# Patient Record
Sex: MALE | Race: AMERICAN INDIAN OR ALASKA NATIVE | NOT HISPANIC OR LATINO | Employment: OTHER | URBAN - METROPOLITAN AREA
[De-identification: names, ages, dates, MRNs, and addresses within clinical notes are randomized per-mention and may not be internally consistent; named-entity substitution may affect disease eponyms.]

---

## 2024-05-06 ENCOUNTER — HOSPITAL ENCOUNTER (OUTPATIENT)
Dept: RADIOLOGY | Facility: CLINIC | Age: 64
Discharge: HOME | End: 2024-05-06
Payer: MEDICARE

## 2024-05-06 ENCOUNTER — LAB (OUTPATIENT)
Dept: LAB | Facility: LAB | Age: 64
End: 2024-05-06
Payer: MEDICARE

## 2024-05-06 DIAGNOSIS — E11.9 TYPE 2 DIABETES MELLITUS WITHOUT COMPLICATIONS (MULTI): Primary | ICD-10-CM

## 2024-05-06 DIAGNOSIS — R05.1 ACUTE COUGH: ICD-10-CM

## 2024-05-06 LAB
ALBUMIN SERPL-MCNC: 4.4 G/DL (ref 3.5–5)
ALP BLD-CCNC: 85 U/L (ref 35–125)
ALT SERPL-CCNC: 23 U/L (ref 5–40)
ANION GAP SERPL CALC-SCNC: >19 MMOL/L
AST SERPL-CCNC: 18 U/L (ref 5–40)
BILIRUB SERPL-MCNC: 0.5 MG/DL (ref 0.1–1.2)
BUN SERPL-MCNC: 18 MG/DL (ref 8–25)
CALCIUM SERPL-MCNC: 9.8 MG/DL (ref 8.5–10.4)
CHLORIDE SERPL-SCNC: 95 MMOL/L (ref 97–107)
CHOLEST SERPL-MCNC: 168 MG/DL (ref 133–200)
CHOLEST/HDLC SERPL: 2.2 {RATIO}
CO2 SERPL-SCNC: 23 MMOL/L (ref 24–31)
CREAT SERPL-MCNC: 0.8 MG/DL (ref 0.4–1.6)
CREAT UR-MCNC: 48 MG/DL
EGFRCR SERPLBLD CKD-EPI 2021: >90 ML/MIN/1.73M*2
ERYTHROCYTE [DISTWIDTH] IN BLOOD BY AUTOMATED COUNT: 12.9 % (ref 11.5–14.5)
EST. AVERAGE GLUCOSE BLD GHB EST-MCNC: 189 MG/DL
GLUCOSE SERPL-MCNC: 167 MG/DL (ref 65–99)
HBA1C MFR BLD: 8.2 %
HCT VFR BLD AUTO: 47.9 % (ref 41–52)
HDLC SERPL-MCNC: 75 MG/DL
HGB BLD-MCNC: 15.5 G/DL (ref 13.5–17.5)
LDLC SERPL CALC-MCNC: 79 MG/DL (ref 65–130)
MCH RBC QN AUTO: 30.2 PG (ref 26–34)
MCHC RBC AUTO-ENTMCNC: 32.4 G/DL (ref 32–36)
MCV RBC AUTO: 93 FL (ref 80–100)
MICROALBUMIN UR-MCNC: <12 MG/L (ref 0–23)
MICROALBUMIN/CREAT UR: NORMAL MG/G{CREAT}
NRBC BLD-RTO: 0 /100 WBCS (ref 0–0)
PLATELET # BLD AUTO: 187 X10*3/UL (ref 150–450)
POTASSIUM SERPL-SCNC: 4.6 MMOL/L (ref 3.4–5.1)
PROT SERPL-MCNC: 7.6 G/DL (ref 5.9–7.9)
RBC # BLD AUTO: 5.13 X10*6/UL (ref 4.5–5.9)
SODIUM SERPL-SCNC: 138 MMOL/L (ref 133–145)
TRIGL SERPL-MCNC: 71 MG/DL (ref 40–150)
WBC # BLD AUTO: 7.4 X10*3/UL (ref 4.4–11.3)

## 2024-05-06 PROCEDURE — 80061 LIPID PANEL: CPT

## 2024-05-06 PROCEDURE — 71046 X-RAY EXAM CHEST 2 VIEWS: CPT | Performed by: RADIOLOGY

## 2024-05-06 PROCEDURE — 80053 COMPREHEN METABOLIC PANEL: CPT

## 2024-05-06 PROCEDURE — 71046 X-RAY EXAM CHEST 2 VIEWS: CPT

## 2024-05-06 PROCEDURE — 85027 COMPLETE CBC AUTOMATED: CPT

## 2024-05-06 PROCEDURE — 83036 HEMOGLOBIN GLYCOSYLATED A1C: CPT

## 2024-05-06 PROCEDURE — 36415 COLL VENOUS BLD VENIPUNCTURE: CPT

## 2024-05-06 PROCEDURE — 82043 UR ALBUMIN QUANTITATIVE: CPT

## 2024-05-06 PROCEDURE — 82570 ASSAY OF URINE CREATININE: CPT

## 2024-10-18 ENCOUNTER — APPOINTMENT (OUTPATIENT)
Dept: OTOLARYNGOLOGY | Facility: CLINIC | Age: 64
End: 2024-10-18
Payer: MEDICARE

## 2024-10-18 ENCOUNTER — CLINICAL SUPPORT (OUTPATIENT)
Dept: AUDIOLOGY | Facility: CLINIC | Age: 64
End: 2024-10-18
Payer: MEDICARE

## 2024-10-18 VITALS
WEIGHT: 255 LBS | BODY MASS INDEX: 34.54 KG/M2 | SYSTOLIC BLOOD PRESSURE: 138 MMHG | DIASTOLIC BLOOD PRESSURE: 89 MMHG | HEIGHT: 72 IN

## 2024-10-18 DIAGNOSIS — H69.91 DYSFUNCTION OF RIGHT EUSTACHIAN TUBE: Primary | ICD-10-CM

## 2024-10-18 DIAGNOSIS — J30.9 CHRONIC ALLERGIC RHINITIS: ICD-10-CM

## 2024-10-18 DIAGNOSIS — H90.11 CONDUCTIVE HEARING LOSS OF RIGHT EAR WITH UNRESTRICTED HEARING OF LEFT EAR: ICD-10-CM

## 2024-10-18 PROCEDURE — 92550 TYMPANOMETRY & REFLEX THRESH: CPT | Performed by: AUDIOLOGIST

## 2024-10-18 PROCEDURE — 1036F TOBACCO NON-USER: CPT | Performed by: OTOLARYNGOLOGY

## 2024-10-18 PROCEDURE — 3008F BODY MASS INDEX DOCD: CPT | Performed by: OTOLARYNGOLOGY

## 2024-10-18 PROCEDURE — 92557 COMPREHENSIVE HEARING TEST: CPT | Performed by: AUDIOLOGIST

## 2024-10-18 PROCEDURE — 99204 OFFICE O/P NEW MOD 45 MIN: CPT | Performed by: OTOLARYNGOLOGY

## 2024-10-18 RX ORDER — LOSARTAN POTASSIUM AND HYDROCHLOROTHIAZIDE 25; 100 MG/1; MG/1
1 TABLET ORAL DAILY
COMMUNITY

## 2024-10-18 RX ORDER — AMLODIPINE BESYLATE 5 MG/1
5 TABLET ORAL DAILY
COMMUNITY

## 2024-10-18 RX ORDER — FLUTICASONE PROPIONATE 50 MCG
2 SPRAY, SUSPENSION (ML) NASAL DAILY
Qty: 16 G | Refills: 11 | Status: SHIPPED | OUTPATIENT
Start: 2024-10-18 | End: 2025-10-18

## 2024-10-18 RX ORDER — CHOLECALCIFEROL (VITAMIN D3) 50 MCG
8000 TABLET ORAL
COMMUNITY

## 2024-10-18 RX ORDER — ROSUVASTATIN CALCIUM 10 MG/1
10 TABLET, COATED ORAL DAILY
COMMUNITY

## 2024-10-18 ASSESSMENT — PAIN SCALES - GENERAL: PAINLEVEL_OUTOF10: 0 - NO PAIN

## 2024-10-18 ASSESSMENT — ENCOUNTER SYMPTOMS: OCCASIONAL FEELINGS OF UNSTEADINESS: 0

## 2024-10-18 ASSESSMENT — PAIN - FUNCTIONAL ASSESSMENT: PAIN_FUNCTIONAL_ASSESSMENT: 0-10

## 2024-10-18 NOTE — PROGRESS NOTES
Impression:  1. Dysfunction of right eustachian tube        2. Conductive hearing loss of right ear with unrestricted hearing of left ear        3. Chronic allergic rhinitis             RECOMMENDATIONS/PLAN :  I reassured the patient there is no evidence of any infection in that right ear however his tympanic membrane is severely retracted with decreased mobility.  We will place him on Flonase nasal spray-2 puffs each nostril daily for the next 6 weeks.  I will see him back in the office and we will leave enough time to possibly place a tympanostomy incision if needed      **This electronic medical record note was created with the use of voice recognition software.  Despite proofreading, typographical or grammatical errors may be present that could affect meaning of content **    Subjective   Patient ID:     Darrell Velazquez is a 64 y.o. male who presents to the office today complaining of decreased hearing with fullness and pressure in the right ear.  The patient did have a history of ear infections when he was a child.  No recent fever chills or pus draining from his nose or sinuses.  No drainage from his ears.  No imbalance or vertigo.  Hearing is slightly down on that right side.    ROS:  A detailed 12 system review of systems is noted on the intake form has been reviewed with the patient with details noted in the HPI and scanned into the patient's medical record.    Objective     History reviewed. No pertinent past medical history.     History reviewed. No pertinent surgical history.     No Known Allergies       Current Outpatient Medications:     amLODIPine (Norvasc) 5 mg tablet, Take 1 tablet (5 mg) by mouth once daily., Disp: , Rfl:     cholecalciferol (Vitamin D-3) 50 MCG (2000 UT) tablet, Take 4 tablets (8,000 Units) by mouth., Disp: , Rfl:     losartan-hydrochlorothiazide (Hyzaar) 100-25 mg tablet, Take 1 tablet by mouth once daily., Disp: , Rfl:     rosuvastatin (Crestor) 10 mg tablet, Take 1 tablet (10 mg)  by mouth once daily., Disp: , Rfl:      Tobacco Use: Low Risk  (10/18/2024)    Patient History     Smoking Tobacco Use: Never     Smokeless Tobacco Use: Never     Passive Exposure: Not on file        Alcohol Use: Alcohol Misuse (9/17/2020)    [facility], [facility]    AUDIT-C     Frequency of Alcohol Consumption: 2-3 times a week     Average Number of Drinks: 3 or 4     Frequency of Binge Drinking: Never        Social History     Substance and Sexual Activity   Drug Use Not on file        Physical Exam:  Visit Vitals  /89 (BP Location: Right arm, Patient Position: Sitting, BP Cuff Size: Large adult)   Ht 1.829 m (6')   Wt 116 kg (255 lb)   BMI 34.58 kg/m²   Smoking Status Never   BSA 2.43 m²      General: Patient is alert, oriented, cooperative in no apparent distress.  Head: Normocephalic, atraumatic.  Eyes: PERRL, EOMI, Conjunctiva is clear. No nystagmus.  Ears: Right Ear-- Pinna is normal.  External auditory canal is patent. Tympanic membrane is retracted with decreased mobility.  He may have some noninfected fluid in that right middle ear space..  Mastoid is nontender.  Left ear-- Pinna is normal.  External auditory canal is patent. Tympanic membrane is [intact, translucent and has good mobility with my pneumatic otoscope.  No effusion].  Mastoid is nontender.  Nose: Septum is straight.  No septal perforation or lesions. No septal hematoma/ seroma.  No signs of bleeding.  Inferior turbinates are mildly swollen.   No evidence of intranasal polyps.  No infectious drainage.  Throat:  Floor of mouth is clear, no masses.  Tongue appears normal, no lesions or masses. Gums, gingiva, buccal mucosa appear pink and moist, no lesions. Teeth are in good repair.  No obvious dental infections.  Peritonsillar regions appear symmetric without swelling.  Hard and soft palate appear normal, no obvious cleft. Uvula is midline.  Oropharynx: No lesions. Retropharyngeal wall is flat.  No active  postnasal drip.  Neck: Supple,  no lymphadenopathy.  No masses.  Salivary Glands: Symmetric bilaterally.  No palpable masses.  No evidence of acute infection or salivary stones  Neurologic: Cranial Nerves 2-12 are grossly intact without focal deficits. Cerebellar function testing is normal.     Results:   I reviewed his recent audiogram and his hearing is normal in the left ear and he has a slight conductive loss in the right ear.  Right tympanic membrane is retracted.  Left 1 has good movement on tympanometry.  Word recognition scores are 100% bilaterally.  Speech reception threshold is 35 dB in the right ear and 20 dB in the left ear.    Procedure:   []    Dread Sin, DO

## 2024-12-06 ENCOUNTER — APPOINTMENT (OUTPATIENT)
Dept: OTOLARYNGOLOGY | Facility: CLINIC | Age: 64
End: 2024-12-06
Payer: MEDICARE

## 2024-12-06 DIAGNOSIS — H61.21 IMPACTED CERUMEN OF RIGHT EAR: ICD-10-CM

## 2024-12-06 DIAGNOSIS — H69.91 DYSFUNCTION OF RIGHT EUSTACHIAN TUBE: ICD-10-CM

## 2024-12-06 DIAGNOSIS — H92.01 RIGHT EAR PAIN: Primary | ICD-10-CM

## 2024-12-06 PROCEDURE — 99213 OFFICE O/P EST LOW 20 MIN: CPT | Performed by: OTOLARYNGOLOGY

## 2024-12-06 PROCEDURE — 69210 REMOVE IMPACTED EAR WAX UNI: CPT | Performed by: OTOLARYNGOLOGY

## 2024-12-06 NOTE — PROGRESS NOTES
Impression:              1. Right ear pain        2. Impacted cerumen of right ear        3. Dysfunction of right eustachian tube             Recommendations/Plan:  Using the operative microscope with alligator forcep, I was able to remove the firm piece of cerumen that was stuck against his tympanic membrane and causing pressure.  Afterwards he was doing much better and his tympanic membrane was moving fairly well today.  He will continue Flonase nasal spray for the next 2 weeks and then stop.  At this point I reassured the patient there is no evidence of any residual middle ear fluid today.  He can otherwise follow-up as needed.    **This electronic medical record note was created with the use of voice recognition software.  Despite proofreading, typographical or grammatical errors may be present that could affect meaning of content **    Subjective:  Darrell returns to the office today as a checkup on that right ear.  Overall he feels better.  He does not feel like there is fluid sloshing around in his right middle ear any longer.  No problems in the left ear.  Hearing has improved on that right side.  He still has some pressure on that right ear.    Objective:    There were no vitals taken for this visit.     Current Outpatient Medications   Medication Instructions    amLODIPine (NORVASC) 5 mg, Daily    cholecalciferol (VITAMIN D-3) 8,000 Units    fluticasone (Flonase) 50 mcg/actuation nasal spray 2 sprays, Each Nostril, Daily    losartan-hydrochlorothiazide (Hyzaar) 100-25 mg tablet 1 tablet, Daily    rosuvastatin (CRESTOR) 10 mg, Daily        No Known Allergies     Physical Exam:  Right ear-external canal reveals cerumen that is stuck against his tympanic membrane.  Using a straight pick with alligator forcep I was able to remove this false tympanic membrane.  His TM is still slightly retracted but there is no evidence of any middle ear effusion.  TM has fair mobility.  Middle ear space is clear.  Mastoid  nontender.  Left ear-external canal is patent.  TM intact.  No effusion.  Mastoid nontender.    Results:  []    Procedure:  After informed consent was obtained with the risks, benefits, complications, and alternatives explained to the patient / guardian, the patient was laid back in the ENT chair and the operative microscope was brought to the right ear.  A speculum was placed and using the operative microscope and/or curette/ suction, I was able to carefully remove all of the impacted cerumen that was  sitting against his right tympanic membrane.  After doing so, the patient was feeling much better and had much less pain.  The TM was [intact and slightly retracted however it is starting to move much better now.  No middle ear effusion.  Mastoid nontender.  The patient tolerated the procedure well and there were no complications.      Dread Sin, DO    [id number]

## 2025-02-17 ENCOUNTER — APPOINTMENT (OUTPATIENT)
Dept: OPHTHALMOLOGY | Facility: CLINIC | Age: 65
End: 2025-02-17
Payer: MEDICARE

## 2025-02-17 DIAGNOSIS — H25.813 COMBINED FORMS OF AGE-RELATED CATARACT OF BOTH EYES: ICD-10-CM

## 2025-02-17 DIAGNOSIS — E11.9 DIABETES MELLITUS TYPE 2 WITHOUT RETINOPATHY (MULTI): Primary | ICD-10-CM

## 2025-02-17 DIAGNOSIS — H52.03 HYPEROPIA OF BOTH EYES: ICD-10-CM

## 2025-02-17 PROCEDURE — 99204 OFFICE O/P NEW MOD 45 MIN: CPT | Performed by: OPHTHALMOLOGY

## 2025-02-17 ASSESSMENT — VISUAL ACUITY
OS_PH_SC: 20/30
OD_PH_SC: 20/25
OS_SC: 20/40
OS_SC+: -1
OD_PH_SC+: -2
OD_SC: 20/60
METHOD: SNELLEN - LINEAR
OS_BAT_MED: 20/20
OD_BAT_MED: 20/20
OD_SC+: -2

## 2025-02-17 ASSESSMENT — REFRACTION_MANIFEST
OS_AXIS: 115
OS_CYLINDER: -0.75
OD_AXIS: 075
OD_CYLINDER: -0.25
OD_SPHERE: +2.00
OS_SPHERE: +2.50
OD_AXIS: 075
METHOD_AUTOREFRACTION: 1
OS_SPHERE: +2.25
OS_AXIS: 115
OS_CYLINDER: -1.00
OD_CYLINDER: -0.25
OD_SPHERE: +2.00

## 2025-02-17 ASSESSMENT — CONF VISUAL FIELD
OD_INFERIOR_TEMPORAL_RESTRICTION: 0
OS_INFERIOR_TEMPORAL_RESTRICTION: 0
OS_NORMAL: 1
OD_SUPERIOR_TEMPORAL_RESTRICTION: 0
OD_SUPERIOR_NASAL_RESTRICTION: 0
OS_INFERIOR_NASAL_RESTRICTION: 0
OS_SUPERIOR_TEMPORAL_RESTRICTION: 0
OD_INFERIOR_NASAL_RESTRICTION: 0
OS_SUPERIOR_NASAL_RESTRICTION: 0
OD_NORMAL: 1

## 2025-02-17 ASSESSMENT — TONOMETRY
IOP_METHOD: GOLDMANN APPLANATION
OS_IOP_MMHG: 16
OD_IOP_MMHG: 16

## 2025-02-17 ASSESSMENT — EXTERNAL EXAM - RIGHT EYE: OD_EXAM: NORMAL

## 2025-02-17 ASSESSMENT — CUP TO DISC RATIO
OD_RATIO: 0.4
OS_RATIO: 0.4

## 2025-02-17 ASSESSMENT — EXTERNAL EXAM - LEFT EYE: OS_EXAM: NORMAL

## 2025-02-17 ASSESSMENT — ENCOUNTER SYMPTOMS: EYES NEGATIVE: 1

## 2025-02-17 ASSESSMENT — SLIT LAMP EXAM - LIDS
COMMENTS: NORMAL
COMMENTS: NORMAL

## 2025-02-17 ASSESSMENT — REFRACTION_WEARINGRX
OD_SPHERE: OTC READERS +2.50
OS_SPHERE: OTC READERS +2.50

## 2025-02-17 NOTE — PROGRESS NOTES
First visit    Diabetes mellitus (DM) 2 without Dr  Diabetes mellitus (DM) 2 for 5 years, was diet controlled till 2 mo ago  Last A1c 8.2, metformin for 2 mo, has an upcoming PCP appt  HBP and HLD under control  On exam: no retinopathy  Educated patient on good BG, BP and chol control    2. Hyperopia with Presbyopia  New mrx, prefers bifocals.    3. Combined age related cataracts  Not vis sig  monitor  Fu 1 year